# Patient Record
Sex: MALE | Race: WHITE | NOT HISPANIC OR LATINO | Employment: UNEMPLOYED | URBAN - METROPOLITAN AREA
[De-identification: names, ages, dates, MRNs, and addresses within clinical notes are randomized per-mention and may not be internally consistent; named-entity substitution may affect disease eponyms.]

---
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
## 2018-05-01 ENCOUNTER — HOSPITAL ENCOUNTER (EMERGENCY)
Facility: HOSPITAL | Age: 18
Discharge: HOME | End: 2018-05-01
Admitting: EMERGENCY MEDICINE

## 2018-05-01 ENCOUNTER — APPOINTMENT (OUTPATIENT)
Dept: GENERAL RADIOLOGY | Facility: HOSPITAL | Age: 18
End: 2018-05-01

## 2018-05-01 VITALS
BODY MASS INDEX: 22.4 KG/M2 | WEIGHT: 160 LBS | HEART RATE: 51 BPM | SYSTOLIC BLOOD PRESSURE: 126 MMHG | TEMPERATURE: 98.3 F | DIASTOLIC BLOOD PRESSURE: 74 MMHG | HEIGHT: 71 IN | RESPIRATION RATE: 16 BRPM | OXYGEN SATURATION: 99 %

## 2018-05-01 DIAGNOSIS — J06.9 UPPER RESPIRATORY TRACT INFECTION, UNSPECIFIED TYPE: Primary | ICD-10-CM

## 2018-05-01 DIAGNOSIS — R09.1 PLEURISY: ICD-10-CM

## 2018-05-01 PROCEDURE — 96372 THER/PROPH/DIAG INJ SC/IM: CPT

## 2018-05-01 PROCEDURE — 99283 EMERGENCY DEPT VISIT LOW MDM: CPT

## 2018-05-01 PROCEDURE — 71046 X-RAY EXAM CHEST 2 VIEWS: CPT

## 2018-05-01 PROCEDURE — 99282 EMERGENCY DEPT VISIT SF MDM: CPT | Performed by: EMERGENCY MEDICINE

## 2018-05-01 PROCEDURE — 25010000002 KETOROLAC TROMETHAMINE PER 15 MG: Performed by: EMERGENCY MEDICINE

## 2018-05-01 RX ORDER — KETOROLAC TROMETHAMINE 10 MG/1
10 TABLET, FILM COATED ORAL EVERY 6 HOURS PRN
Qty: 20 TABLET | Refills: 0 | Status: SHIPPED | OUTPATIENT
Start: 2018-05-01 | End: 2019-01-31

## 2018-05-01 RX ORDER — ALBUTEROL SULFATE 90 UG/1
2 AEROSOL, METERED RESPIRATORY (INHALATION) EVERY 4 HOURS PRN
Qty: 8 G | Refills: 0 | Status: SHIPPED | OUTPATIENT
Start: 2018-05-01 | End: 2022-08-16

## 2018-05-01 RX ORDER — KETOROLAC TROMETHAMINE 30 MG/ML
60 INJECTION, SOLUTION INTRAMUSCULAR; INTRAVENOUS ONCE
Status: COMPLETED | OUTPATIENT
Start: 2018-05-01 | End: 2018-05-01

## 2018-05-01 RX ADMIN — KETOROLAC TROMETHAMINE 60 MG: 30 INJECTION, SOLUTION INTRAMUSCULAR at 23:11

## 2018-05-02 NOTE — ED PROVIDER NOTES
Subjective   History of Present Illness  History of Present Illness    Chief complaint: Chest pain with deep breath    Location: Anterior chest    Quality/Severity:  Moderate, sharp    Timing/Onset/Duration: Gradual onset since yesterday    Modifying Factors: It hurts to take a deep breath, better to breathe more shallowly    Associated Symptoms: No headache.  No fever or chills.  Patient said a nonproductive cough.  No sore throat or earache.  The patient said clear nasal congestion.  No abdominal pain.  Patient has mild shortness of breath with deep breath.  The patient had nausea and vomiting on Sunday.  There's been no diarrhea.  The emesis is been nonbloody and nonbilious.  There is no burning on urination.    Narrative: This 18-year-old white male presents with pleuritic chest pain that started yesterday.  The patient denies any fever or chills.  He has a nonproductive cough.  No sore throat or earache.  The patient has clear nasal congestion.  No abdominal pain.  Patient complains of mild shortness of breath with deep breath.  No diarrhea or burning when he urinates.  The patient had nausea and vomiting that was nonbloody and nonbilious on Sunday.    PCP:  Dr. Weaver      Review of Systems   Constitutional: Negative for chills and fever.   HENT: Negative for ear pain and sore throat.    Eyes: Negative for discharge and redness.   Respiratory: Positive for chest tightness and shortness of breath. Negative for cough, wheezing and stridor.    Cardiovascular: Negative for chest pain, palpitations and leg swelling.   Gastrointestinal: Negative for abdominal pain, blood in stool, constipation, diarrhea, nausea and vomiting.   Genitourinary: Negative for dysuria.   Musculoskeletal: Negative for back pain.   Skin: Negative for rash.   Neurological: Negative for headaches.   Hematological: Negative for adenopathy.   Psychiatric/Behavioral: Negative.  Negative for confusion.        Medication List      START taking  these medications    ketorolac 10 MG tablet  Commonly known as:  TORADOL  Take 1 tablet by mouth Every 6 (Six) Hours As Needed for Moderate Pain .        CONTINUE taking these medications    azithromycin 250 MG tablet  Commonly known as:  ZITHROMAX Z-ZAFAR  Take 2 tablets the first day, then 1 tablet daily for 4 days.     guaiFENesin 600 MG 12 hr tablet  Commonly known as:  MUCINEX  Take 2 tablets by mouth 2 (two) times a day.     hydrOXYzine 25 MG tablet  Commonly known as:  ATARAX  Take 1 tablet by mouth Every 6 (Six) Hours As Needed for Itching (Hives)   for up to 24 doses.     ipratropium-albuterol  MCG/ACT inhaler  Commonly known as:  COMBIVENT RESPIMAT     MethylPREDNISolone 4 MG tablet  Commonly known as:  MEDROL (ZAFAR)  follow package directions          Past Medical History:   Diagnosis Date   • Asthma        No Known Allergies    Past Surgical History:   Procedure Laterality Date   • KNEE SURGERY     • KNEE SURGERY     • TONSILLECTOMY         History reviewed. No pertinent family history.    Social History     Social History   • Marital status: Single     Social History Main Topics   • Smoking status: Former Smoker     Packs/day: 1.00     Types: Cigarettes   • Smokeless tobacco: Former User     Quit date: 6/21/2016      Comment: e cigarette   • Alcohol use No   • Drug use: No     Other Topics Concern   • Not on file           Objective   Physical Exam   Constitutional: He is oriented to person, place, and time. He appears well-developed and well-nourished. No distress.   ED Triage Vitals (05/01/18 2151)  Temp: 98.3 °F (36.8 °C)  Heart Rate: 74  Resp: 20  BP: 155/94  SpO2: 100 %  Temp src: Oral  Heart Rate Source: Monitor  Patient Position: Sitting  BP Location: Right arm  FiO2 (%): n/a    The patient's vitals were reviewed by me.  Unless otherwise noted they are within normal limits.     HENT:   Head: Normocephalic and atraumatic.   Right Ear: External ear normal.   Left Ear: External ear normal.    Nose: Nose normal.   Mouth/Throat: Oropharynx is clear and moist.   Eyes: Conjunctivae and EOM are normal. Pupils are equal, round, and reactive to light. Right eye exhibits no discharge. Left eye exhibits no discharge. No scleral icterus.   Neck: Normal range of motion. Neck supple. No JVD present. No tracheal deviation present. No thyromegaly present.   Cardiovascular: Normal rate, regular rhythm, normal heart sounds and intact distal pulses.  Exam reveals no gallop and no friction rub.    No murmur heard.  Pulmonary/Chest: Effort normal and breath sounds normal. No stridor. No respiratory distress. He has no wheezes. He has no rales. He exhibits no tenderness.   Abdominal: Soft. Bowel sounds are normal. He exhibits no distension and no mass. There is no tenderness. There is no rebound and no guarding. No hernia.   Musculoskeletal: Normal range of motion. He exhibits no edema or deformity.   Lymphadenopathy:     He has no cervical adenopathy.   Neurological: He is alert and oriented to person, place, and time.   Skin: Skin is warm and dry. No rash noted. He is not diaphoretic. No erythema. No pallor.   Psychiatric: His behavior is normal.   Nursing note and vitals reviewed.      Procedures           ED Course  ED Course                  MDM    ED Disposition     ED Disposition Condition Comment    Discharge Stable             Final diagnoses:   Upper respiratory tract infection, unspecified type   Pleurisy            Garrick Garcia MD  05/02/18 2877

## 2018-05-02 NOTE — DISCHARGE INSTRUCTIONS
Take pseudoephedrine and/or Robitussin as needed as directed for congestion and cough.  Follow-up with Dr. Hubbard in one week.  Return to emergency department if there is increased pain, shortness of breath, worse in any way at all.

## 2019-01-15 ENCOUNTER — HOSPITAL ENCOUNTER (OUTPATIENT)
Dept: GENERAL RADIOLOGY | Facility: HOSPITAL | Age: 19
Discharge: HOME | End: 2019-01-15
Attending: PEDIATRICS | Admitting: PEDIATRICS

## 2019-01-15 ENCOUNTER — LAB (OUTPATIENT)
Dept: LAB | Facility: HOSPITAL | Age: 19
End: 2019-01-15
Attending: PEDIATRICS

## 2019-01-15 ENCOUNTER — TRANSCRIBE ORDERS (OUTPATIENT)
Dept: ADMINISTRATIVE | Facility: HOSPITAL | Age: 19
End: 2019-01-15

## 2019-01-15 DIAGNOSIS — R10.9 ABDOMINAL PAIN, UNSPECIFIED ABDOMINAL LOCATION: ICD-10-CM

## 2019-01-15 DIAGNOSIS — R10.9 ABDOMINAL PAIN, UNSPECIFIED ABDOMINAL LOCATION: Primary | ICD-10-CM

## 2019-01-15 LAB
ALBUMIN SERPL-MCNC: 4.6 G/DL (ref 3.5–5.2)
ALBUMIN/GLOB SERPL: 1.7 G/DL
ALP SERPL-CCNC: 89 U/L (ref 40–129)
ALT SERPL W P-5'-P-CCNC: 9 U/L (ref 5–41)
AMYLASE SERPL-CCNC: 36 U/L (ref 28–100)
ANION GAP SERPL CALCULATED.3IONS-SCNC: 10.1 MMOL/L
AST SERPL-CCNC: 20 U/L (ref 5–40)
BASOPHILS # BLD AUTO: 0.02 10*3/MM3 (ref 0–0.2)
BASOPHILS NFR BLD AUTO: 0.1 % (ref 0–2)
BILIRUB SERPL-MCNC: 0.7 MG/DL (ref 0.2–1.2)
BUN BLD-MCNC: 7 MG/DL (ref 6–20)
BUN/CREAT SERPL: 8.3 (ref 7–25)
CALCIUM SPEC-SCNC: 9.5 MG/DL (ref 8.6–10.5)
CHLORIDE SERPL-SCNC: 98 MMOL/L (ref 98–107)
CO2 SERPL-SCNC: 27.9 MMOL/L (ref 22–29)
CREAT BLD-MCNC: 0.84 MG/DL (ref 0.76–1.27)
DEPRECATED RDW RBC AUTO: 41.9 FL (ref 37–54)
EOSINOPHIL # BLD AUTO: 0.12 10*3/MM3 (ref 0.1–0.3)
EOSINOPHIL NFR BLD AUTO: 0.7 % (ref 0–4)
ERYTHROCYTE [DISTWIDTH] IN BLOOD BY AUTOMATED COUNT: 13.5 % (ref 11.5–14.5)
GFR SERPL CREATININE-BSD FRML MDRD: 116 ML/MIN/1.73
GGT SERPL-CCNC: 16 U/L (ref 8–61)
GLOBULIN UR ELPH-MCNC: 2.7 GM/DL
GLUCOSE BLD-MCNC: 110 MG/DL (ref 65–99)
HCT VFR BLD AUTO: 43.5 % (ref 42–52)
HGB BLD-MCNC: 14.9 G/DL (ref 14–18)
IMM GRANULOCYTES # BLD AUTO: 0.1 10*3/MM3 (ref 0–0.03)
IMM GRANULOCYTES NFR BLD AUTO: 0.6 % (ref 0–0.5)
LIPASE SERPL-CCNC: 16 U/L (ref 13–60)
LYMPHOCYTES # BLD AUTO: 1.6 10*3/MM3 (ref 0.6–4.8)
LYMPHOCYTES NFR BLD AUTO: 9.2 % (ref 20–45)
MCH RBC QN AUTO: 29 PG (ref 27–31)
MCHC RBC AUTO-ENTMCNC: 34.3 G/DL (ref 31–37)
MCV RBC AUTO: 84.6 FL (ref 80–94)
MONOCYTES # BLD AUTO: 1.38 10*3/MM3 (ref 0–1)
MONOCYTES NFR BLD AUTO: 7.9 % (ref 3–8)
NEUTROPHILS # BLD AUTO: 14.15 10*3/MM3 (ref 1.5–8.3)
NEUTROPHILS NFR BLD AUTO: 81.5 % (ref 45–70)
NRBC BLD AUTO-RTO: 0 /100 WBC (ref 0–0)
PLATELET # BLD AUTO: 217 10*3/MM3 (ref 140–500)
PMV BLD AUTO: 10.5 FL (ref 7.4–10.4)
POTASSIUM BLD-SCNC: 4 MMOL/L (ref 3.5–5.2)
PROT SERPL-MCNC: 7.3 G/DL (ref 6–8.5)
RBC # BLD AUTO: 5.14 10*6/MM3 (ref 4.7–6.1)
SODIUM BLD-SCNC: 136 MMOL/L (ref 136–145)
WBC NRBC COR # BLD: 17.37 10*3/MM3 (ref 4.8–10.8)

## 2019-01-15 PROCEDURE — 83690 ASSAY OF LIPASE: CPT

## 2019-01-15 PROCEDURE — 74018 RADEX ABDOMEN 1 VIEW: CPT

## 2019-01-15 PROCEDURE — 80053 COMPREHEN METABOLIC PANEL: CPT

## 2019-01-15 PROCEDURE — 82977 ASSAY OF GGT: CPT

## 2019-01-15 PROCEDURE — 36415 COLL VENOUS BLD VENIPUNCTURE: CPT

## 2019-01-15 PROCEDURE — 82150 ASSAY OF AMYLASE: CPT

## 2019-01-15 PROCEDURE — 85025 COMPLETE CBC W/AUTO DIFF WBC: CPT

## 2019-01-31 ENCOUNTER — OFFICE VISIT (OUTPATIENT)
Dept: GASTROENTEROLOGY | Facility: CLINIC | Age: 19
End: 2019-01-31

## 2019-01-31 ENCOUNTER — APPOINTMENT (OUTPATIENT)
Dept: LAB | Facility: HOSPITAL | Age: 19
End: 2019-01-31
Attending: INTERNAL MEDICINE

## 2019-01-31 VITALS
SYSTOLIC BLOOD PRESSURE: 122 MMHG | HEIGHT: 71 IN | DIASTOLIC BLOOD PRESSURE: 66 MMHG | BODY MASS INDEX: 21.45 KG/M2 | WEIGHT: 153.2 LBS

## 2019-01-31 DIAGNOSIS — K21.9 GASTROESOPHAGEAL REFLUX DISEASE, ESOPHAGITIS PRESENCE NOT SPECIFIED: ICD-10-CM

## 2019-01-31 DIAGNOSIS — R10.84 GENERALIZED ABDOMINAL PAIN: Primary | ICD-10-CM

## 2019-01-31 LAB
BACTERIA UR QL AUTO: ABNORMAL /HPF
BILIRUB UR QL STRIP: NEGATIVE
CLARITY UR: CLEAR
COLOR UR: YELLOW
GLUCOSE UR STRIP-MCNC: NEGATIVE MG/DL
HGB UR QL STRIP.AUTO: ABNORMAL
HYALINE CASTS UR QL AUTO: ABNORMAL /LPF
KETONES UR QL STRIP: NEGATIVE
LEUKOCYTE ESTERASE UR QL STRIP.AUTO: NEGATIVE
NITRITE UR QL STRIP: NEGATIVE
PH UR STRIP.AUTO: 5.5 [PH] (ref 4.5–8)
PROT UR QL STRIP: NEGATIVE
RBC # UR: ABNORMAL /HPF
REF LAB TEST METHOD: ABNORMAL
SP GR UR STRIP: 1.02 (ref 1–1.03)
SQUAMOUS #/AREA URNS HPF: ABNORMAL /HPF
UROBILINOGEN UR QL STRIP: ABNORMAL
WBC UR QL AUTO: ABNORMAL /HPF

## 2019-01-31 PROCEDURE — 99203 OFFICE O/P NEW LOW 30 MIN: CPT | Performed by: INTERNAL MEDICINE

## 2019-01-31 PROCEDURE — 81001 URINALYSIS AUTO W/SCOPE: CPT | Performed by: INTERNAL MEDICINE

## 2019-01-31 NOTE — PROGRESS NOTES
PATIENT INFORMATION  Randy Perdue       - 2000    CHIEF COMPLAINT  Chief Complaint   Patient presents with   • Abdominal Pain   • Heartburn       HISTORY OF PRESENT ILLNESS  HPI    19 yo with 3 episodes of periumbilical abdominal pain in the last month. No radiation, severity is moderate to severe, lasts minutes. No known aggravating or alleviating factors.  No constipation. Weight has been stable. He notes some issues with heartburn with these episodes.  He was given rx for ppi but not started yet.  No dysuria or hematuria.  He went to pcp and had labs and x-ray done. No acute findings on x-ray.  HISTORY:  18-year-old male complaining of 3 day history generalized abdomen pain.  Evaluate stool burden.     TECHNIQUE:  AP supine images of the abdomen and pelvis.     FINDINGS:  Bowel gas pattern is normal. There is no bowel dilatation. No radiopaque  renal or urinary calculi. Low volume stool burden.     IMPRESSION:  Negative abdomen.    Labs with elevated wbc of 17.  Grandmother with crohns  No heartburn, dysphagia or odynophagia.  Weight has been stable.  REVIEW OF SYSTEMS  Review of Systems   Respiratory: Positive for wheezing and stridor.    Gastrointestinal: Positive for abdominal pain.        Reflux   All other systems reviewed and are negative.        ACTIVE PROBLEMS  There are no active problems to display for this patient.        PAST MEDICAL HISTORY  Past Medical History:   Diagnosis Date   • Asthma          SURGICAL HISTORY  Past Surgical History:   Procedure Laterality Date   • KNEE SURGERY     • KNEE SURGERY     • TONSILLECTOMY           FAMILY HISTORY  Family History   Problem Relation Age of Onset   • Colon polyps Mother    • Colon polyps Maternal Grandmother    • Colon cancer Neg Hx          SOCIAL HISTORY  Social History     Occupational History   • Not on file   Tobacco Use   • Smoking status: Former Smoker     Packs/day: 1.00     Types: Cigarettes   • Smokeless tobacco: Former User     " Quit date: 6/21/2016   • Tobacco comment: e cigarette   Substance and Sexual Activity   • Alcohol use: No   • Drug use: No   • Sexual activity: Not on file       Debilities/Disabilities Identified: None    Emotional Behavior: Appropriate    CURRENT MEDICATIONS    Current Outpatient Medications:   •  albuterol (PROVENTIL HFA;VENTOLIN HFA) 108 (90 Base) MCG/ACT inhaler, Inhale 2 puffs Every 4 (Four) Hours As Needed for Wheezing or Shortness of Air., Disp: 8 g, Rfl: 0    ALLERGIES  Patient has no known allergies.    VITALS  Vitals:    01/31/19 1141   BP: 122/66   Weight: 69.5 kg (153 lb 3.2 oz)   Height: 180.3 cm (70.98\")       LAST RESULTS   Lab on 01/15/2019   Component Date Value Ref Range Status   • Glucose 01/15/2019 110* 65 - 99 mg/dL Final   • BUN 01/15/2019 7  6 - 20 mg/dL Final   • Creatinine 01/15/2019 0.84  0.76 - 1.27 mg/dL Final   • Sodium 01/15/2019 136  136 - 145 mmol/L Final   • Potassium 01/15/2019 4.0  3.5 - 5.2 mmol/L Final   • Chloride 01/15/2019 98  98 - 107 mmol/L Final   • CO2 01/15/2019 27.9  22.0 - 29.0 mmol/L Final   • Calcium 01/15/2019 9.5  8.6 - 10.5 mg/dL Final   • Total Protein 01/15/2019 7.3  6.0 - 8.5 g/dL Final   • Albumin 01/15/2019 4.60  3.50 - 5.20 g/dL Final   • ALT (SGPT) 01/15/2019 9  5 - 41 U/L Final   • AST (SGOT) 01/15/2019 20  5 - 40 U/L Final   • Alkaline Phosphatase 01/15/2019 89  40 - 129 U/L Final   • Total Bilirubin 01/15/2019 0.7  0.2 - 1.2 mg/dL Final   • eGFR Non  Amer 01/15/2019 116  >60 mL/min/1.73 Final   • Globulin 01/15/2019 2.7  gm/dL Final   • A/G Ratio 01/15/2019 1.7  g/dL Final   • BUN/Creatinine Ratio 01/15/2019 8.3  7.0 - 25.0 Final   • Anion Gap 01/15/2019 10.1  mmol/L Final   • GGT 01/15/2019 16  8 - 61 U/L Final   • Lipase 01/15/2019 16  13 - 60 U/L Final   • Amylase 01/15/2019 36  28 - 100 U/L Final   • WBC 01/15/2019 17.37* 4.80 - 10.80 10*3/mm3 Final   • RBC 01/15/2019 5.14  4.70 - 6.10 10*6/mm3 Final   • Hemoglobin 01/15/2019 14.9  14.0 - " 18.0 g/dL Final   • Hematocrit 01/15/2019 43.5  42.0 - 52.0 % Final   • MCV 01/15/2019 84.6  80.0 - 94.0 fL Final   • MCH 01/15/2019 29.0  27.0 - 31.0 pg Final   • MCHC 01/15/2019 34.3  31.0 - 37.0 g/dL Final   • RDW 01/15/2019 13.5  11.5 - 14.5 % Final   • RDW-SD 01/15/2019 41.9  37.0 - 54.0 fl Final   • MPV 01/15/2019 10.5* 7.4 - 10.4 fL Final   • Platelets 01/15/2019 217  140 - 500 10*3/mm3 Final   • Neutrophil % 01/15/2019 81.5* 45.0 - 70.0 % Final   • Lymphocyte % 01/15/2019 9.2* 20.0 - 45.0 % Final   • Monocyte % 01/15/2019 7.9  3.0 - 8.0 % Final   • Eosinophil % 01/15/2019 0.7  0.0 - 4.0 % Final   • Basophil % 01/15/2019 0.1  0.0 - 2.0 % Final   • Immature Grans % 01/15/2019 0.6* 0.0 - 0.5 % Final   • Neutrophils, Absolute 01/15/2019 14.15* 1.50 - 8.30 10*3/mm3 Final   • Lymphocytes, Absolute 01/15/2019 1.60  0.60 - 4.80 10*3/mm3 Final   • Monocytes, Absolute 01/15/2019 1.38* 0.00 - 1.00 10*3/mm3 Final   • Eosinophils, Absolute 01/15/2019 0.12  0.10 - 0.30 10*3/mm3 Final   • Basophils, Absolute 01/15/2019 0.02  0.00 - 0.20 10*3/mm3 Final   • Immature Grans, Absolute 01/15/2019 0.10* 0.00 - 0.03 10*3/mm3 Final   • nRBC 01/15/2019 0.0  0.0 - 0.0 /100 WBC Final     Xr Abdomen 1 View    Result Date: 1/15/2019  Narrative: ABDOMEN, 1/15/2019     HISTORY: 18-year-old male complaining of 3 day history generalized abdomen pain. Evaluate stool burden.  TECHNIQUE: AP supine images of the abdomen and pelvis.  FINDINGS: Bowel gas pattern is normal. There is no bowel dilatation. No radiopaque renal or urinary calculi. Low volume stool burden.      Impression: Negative abdomen.  This report was finalized on 1/15/2019 12:11 PM by Dr. Adolfo Valdovinos MD.        PHYSICAL EXAM  Physical Exam   Constitutional: He is oriented to person, place, and time. He appears well-developed and well-nourished. No distress.   HENT:   Head: Normocephalic and atraumatic.   Eyes: EOM are normal. Pupils are equal, round, and reactive to light.    Neck: Neck supple. No tracheal deviation present.   Cardiovascular: Normal rate, regular rhythm, normal heart sounds and intact distal pulses. Exam reveals no gallop and no friction rub.   No murmur heard.  Pulmonary/Chest: Effort normal and breath sounds normal. No respiratory distress. He has no wheezes. He has no rales. He exhibits no tenderness.   Abdominal: Soft. Bowel sounds are normal. He exhibits no distension. There is no tenderness. There is no rebound and no guarding.   Musculoskeletal: He exhibits no edema.   Lymphadenopathy:     He has no cervical adenopathy.   Neurological: He is alert and oriented to person, place, and time.   Skin: Skin is warm. He is not diaphoretic. No erythema.   Psychiatric: He has a normal mood and affect. His behavior is normal. Judgment and thought content normal.   Nursing note and vitals reviewed.      ASSESSMENT  Diagnoses and all orders for this visit:    Generalized abdominal pain  -     CT Abdomen Pelvis With Contrast; Future    Gastroesophageal reflux disease, esophagitis presence not specified  -     CT Abdomen Pelvis With Contrast; Future          PLAN  No Follow-up on file.    Will see after ct and ua done  Diet diary  May need gb evaluation

## 2019-02-11 ENCOUNTER — HOSPITAL ENCOUNTER (OUTPATIENT)
Dept: CT IMAGING | Facility: HOSPITAL | Age: 19
Discharge: HOME | End: 2019-02-11
Attending: INTERNAL MEDICINE | Admitting: INTERNAL MEDICINE

## 2019-02-11 DIAGNOSIS — R10.84 GENERALIZED ABDOMINAL PAIN: ICD-10-CM

## 2019-02-11 DIAGNOSIS — K21.9 GASTROESOPHAGEAL REFLUX DISEASE, ESOPHAGITIS PRESENCE NOT SPECIFIED: ICD-10-CM

## 2019-02-11 PROCEDURE — 74177 CT ABD & PELVIS W/CONTRAST: CPT

## 2019-02-11 PROCEDURE — 0 DIATRIZOATE MEGLUMINE & SODIUM PER 1 ML: Performed by: INTERNAL MEDICINE

## 2019-02-11 PROCEDURE — 0 IOPAMIDOL PER 1 ML: Performed by: INTERNAL MEDICINE

## 2019-02-11 RX ADMIN — IOPAMIDOL 100 ML: 755 INJECTION, SOLUTION INTRAVENOUS at 12:32

## 2019-02-11 RX ADMIN — DIATRIZOATE MEGLUMINE AND DIATRIZOATE SODIUM 30 ML: 600; 100 SOLUTION ORAL; RECTAL at 10:40

## 2019-02-19 RX ORDER — OMEPRAZOLE 20 MG/1
20 CAPSULE, DELAYED RELEASE ORAL DAILY
Qty: 30 CAPSULE | Refills: 5 | Status: SHIPPED | OUTPATIENT
Start: 2019-02-19 | End: 2020-02-08

## 2021-06-16 ENCOUNTER — HOSPITAL ENCOUNTER (EMERGENCY)
Facility: HOSPITAL | Age: 21
Discharge: HOME | End: 2021-06-16
Attending: EMERGENCY MEDICINE | Admitting: EMERGENCY MEDICINE

## 2021-06-16 ENCOUNTER — APPOINTMENT (OUTPATIENT)
Dept: GENERAL RADIOLOGY | Facility: HOSPITAL | Age: 21
End: 2021-06-16

## 2021-06-16 VITALS
OXYGEN SATURATION: 98 % | SYSTOLIC BLOOD PRESSURE: 109 MMHG | RESPIRATION RATE: 15 BRPM | WEIGHT: 149.3 LBS | HEART RATE: 54 BPM | HEIGHT: 70 IN | TEMPERATURE: 98.2 F | DIASTOLIC BLOOD PRESSURE: 56 MMHG | BODY MASS INDEX: 21.37 KG/M2
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
## 2021-06-16 DIAGNOSIS — S80.01XA CONTUSION OF RIGHT KNEE, INITIAL ENCOUNTER: Primary | ICD-10-CM

## 2021-06-16 PROCEDURE — 96372 THER/PROPH/DIAG INJ SC/IM: CPT

## 2021-06-16 PROCEDURE — 99283 EMERGENCY DEPT VISIT LOW MDM: CPT

## 2021-06-16 PROCEDURE — 25010000002 KETOROLAC TROMETHAMINE PER 15 MG: Performed by: EMERGENCY MEDICINE

## 2021-06-16 PROCEDURE — 99282 EMERGENCY DEPT VISIT SF MDM: CPT | Performed by: EMERGENCY MEDICINE

## 2021-06-16 PROCEDURE — 73562 X-RAY EXAM OF KNEE 3: CPT

## 2021-06-16 RX ORDER — KETOROLAC TROMETHAMINE 30 MG/ML
30 INJECTION, SOLUTION INTRAMUSCULAR; INTRAVENOUS EVERY 6 HOURS PRN
Status: DISCONTINUED | OUTPATIENT
Start: 2021-06-16 | End: 2021-06-16 | Stop reason: HOSPADM

## 2021-06-16 RX ADMIN — KETOROLAC TROMETHAMINE 30 MG: 30 INJECTION, SOLUTION INTRAMUSCULAR; INTRAVENOUS at 11:16

## 2021-06-16 NOTE — DISCHARGE INSTRUCTIONS
Take ibuprofen or Tylenol as needed for pain.  Ice the knee 3-4 times daily for 10 to 15 minutes.  Please follow-up with your primary care physician for reevaluation and if not getting better in 1 week.  Return to the emergency room if you develop chest pain, difficulty breathing, abdominal pain or for any other concerns.
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
## 2022-03-29 ENCOUNTER — HOSPITAL ENCOUNTER (EMERGENCY)
Facility: HOSPITAL | Age: 22
Discharge: HOME | End: 2022-03-29
Attending: EMERGENCY MEDICINE | Admitting: EMERGENCY MEDICINE

## 2022-03-29 VITALS
SYSTOLIC BLOOD PRESSURE: 133 MMHG | BODY MASS INDEX: 21 KG/M2 | HEART RATE: 72 BPM | TEMPERATURE: 98.4 F | HEIGHT: 71 IN | DIASTOLIC BLOOD PRESSURE: 92 MMHG | WEIGHT: 150 LBS | RESPIRATION RATE: 16 BRPM | OXYGEN SATURATION: 99 %

## 2022-03-29 DIAGNOSIS — S00.01XA ABRASION OF SCALP, INITIAL ENCOUNTER: ICD-10-CM

## 2022-03-29 DIAGNOSIS — S09.90XA CLOSED HEAD INJURY, INITIAL ENCOUNTER: ICD-10-CM

## 2022-03-29 DIAGNOSIS — Z04.1 ENCOUNTER FOR EXAMINATION FOLLOWING MOTOR VEHICLE COLLISION (MVC): Primary | ICD-10-CM

## 2022-03-29 DIAGNOSIS — M94.0 COSTOCHONDRITIS, ACUTE: ICD-10-CM

## 2022-03-29 PROCEDURE — 99282 EMERGENCY DEPT VISIT SF MDM: CPT

## 2022-03-29 PROCEDURE — 99283 EMERGENCY DEPT VISIT LOW MDM: CPT | Performed by: EMERGENCY MEDICINE

## 2022-03-29 RX ORDER — IBUPROFEN 800 MG/1
800 TABLET ORAL EVERY 8 HOURS PRN
Qty: 30 TABLET | Refills: 0 | Status: SHIPPED | OUTPATIENT
Start: 2022-03-29 | End: 2022-08-16

## 2022-03-29 RX ORDER — CYCLOBENZAPRINE HCL 10 MG
10 TABLET ORAL 3 TIMES DAILY PRN
Qty: 20 TABLET | Refills: 0 | Status: SHIPPED | OUTPATIENT
Start: 2022-03-29 | End: 2022-08-16